# Patient Record
Sex: FEMALE | Race: WHITE
[De-identification: names, ages, dates, MRNs, and addresses within clinical notes are randomized per-mention and may not be internally consistent; named-entity substitution may affect disease eponyms.]

---
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
## 2025-03-25 ENCOUNTER — HOSPITAL ENCOUNTER (EMERGENCY)
Dept: HOSPITAL 47 - EC | Age: 20
LOS: 1 days | Discharge: HOME | End: 2025-03-26
Payer: COMMERCIAL

## 2025-03-25 VITALS
RESPIRATION RATE: 18 BRPM | HEART RATE: 101 BPM | DIASTOLIC BLOOD PRESSURE: 80 MMHG | SYSTOLIC BLOOD PRESSURE: 119 MMHG | TEMPERATURE: 97.5 F

## 2025-03-25 DIAGNOSIS — F39: Primary | ICD-10-CM

## 2025-03-25 PROCEDURE — 81025 URINE PREGNANCY TEST: CPT

## 2025-03-25 PROCEDURE — 99284 EMERGENCY DEPT VISIT MOD MDM: CPT

## 2025-03-25 PROCEDURE — 87591 N.GONORRHOEAE DNA AMP PROB: CPT

## 2025-03-25 PROCEDURE — 82075 ASSAY OF BREATH ETHANOL: CPT

## 2025-03-25 PROCEDURE — 87491 CHLMYD TRACH DNA AMP PROBE: CPT

## 2025-03-25 PROCEDURE — 81003 URINALYSIS AUTO W/O SCOPE: CPT

## 2025-03-25 PROCEDURE — 80306 DRUG TEST PRSMV INSTRMNT: CPT

## 2025-03-25 NOTE — ED
Psych HPI





- General


Source: patient, RN notes reviewed


Mode of arrival: ambulatory





<Shaw Gasca - Last Filed: 03/25/25 19:00>





- General


Source: patient, RN notes reviewed, old records reviewed, Caregiver


Mode of arrival: ambulatory


Limitations: no limitations





- History of Present Illness


MD Complaint: suicidal ideation, feels depressed


Associated Psychiatric Symptoms: depression, suicidal ideation


History of same: Yes


Quality: constant, getting worse


Improves With: none


Worsens With: none


Associated Symptoms: denies other symptoms


Treatments Prior to Arrival: placed on mental health hold


If Self Harm: admits thoughts of self harm





<Juan Chao - Last Filed: 03/25/25 20:16>





- General


Chief Complaint: Psychiatric Symptoms


Stated Complaint: depression


Time Seen by Provider: 03/25/25 18:37





- History of Present Illness


Initial Comments: 





Quick note: This is a 19-year-old female with history of depression presenting 

for suicidal ideation x 3 days.  Patient endorses history of suicide attempts 

but has not attempted today.  States she does not wish to discuss possible plan 

she has been well in the waiting room.  Denies taking any psychiatric medication

at this time and is not speaking to a therapist/counselor mother.  Denies 

auditory/visual hallucinations, HI. (Shaw Gasca)





- Related Data


                                Home Medications











 Medication  Instructions  Recorded  Confirmed


 


Methylphenidate HCl [Concerta] 54 mg PO DAILY 05/25/15 05/25/15








                                  Previous Rx's











 Medication  Instructions  Recorded


 


Amoxicillin 250 mg PO Q8HR #150 ml 05/25/15


 


Amoxicillin 500 mg PO BID 10 Days #20 capsule 08/27/18


 


Ibuprofen 200 mg PO Q6H PRN 5 Days #20 08/27/18





 capsule 











                                    Allergies











Allergy/AdvReac Type Severity Reaction Status Date / Time


 


No Known Allergies Allergy   Verified 08/27/18 16:59














Review of Systems


ROS Other: All systems not noted in ROS Statement are negative.





<Shaw Gasca - Last Filed: 03/25/25 19:00>


ROS Other: All systems not noted in ROS Statement are negative.





<Juan Chao - Last Filed: 03/25/25 20:16>


ROS Statement: 


Those systems with pertinent positive or pertinent negative responses have been 

documented in the HPI.








Past Medical History


Past Medical History: No Reported History


History of Any Multi-Drug Resistant Organisms: None Reported


Past Surgical History: No Surgical Hx Reported


Past Psychological History: ADD/ADHD


Smoking Status: Never smoker


Past Alcohol Use History: None Reported


Past Drug Use History: None Reported





<Shaw Gasca - Last Filed: 03/25/25 19:00>





General Exam


Limitations: no limitations





<Shaw Gasca - Last Filed: 03/25/25 19:00>


General appearance: alert, in no apparent distress


Head exam: Present: atraumatic, normocephalic, normal inspection


Eye exam: Present: normal appearance, PERRL, EOMI.  Absent: scleral icterus, co

njunctival injection, periorbital swelling


ENT exam: Present: normal exam, mucous membranes moist


Neck exam: Present: normal inspection.  Absent: tenderness, meningismus, 

lymphadenopathy


Respiratory exam: Present: normal lung sounds bilaterally.  Absent: respiratory 

distress, wheezes, rales, rhonchi, stridor


Cardiovascular Exam: Present: regular rate, normal rhythm, normal heart sounds. 

 Absent: systolic murmur, diastolic murmur, rubs, gallop, clicks


GI/Abdominal exam: Present: soft, normal bowel sounds.  Absent: distended, 

tenderness, guarding, rebound, rigid


Extremities exam: Present: normal inspection, full ROM, normal capillary refill.

  Absent: tenderness, pedal edema, joint swelling, calf tenderness


Back exam: Present: normal inspection


Neurological exam: Present: alert, oriented X3, CN II-XII intact


Psychiatric exam: Present: normal affect, normal mood


Skin exam: Present: warm, dry, intact, normal color.  Absent: rash





<Juan Chao - Last Filed: 03/25/25 20:16>





- General Exam Comments


Initial Comments: 





Visual Physical Exam





Vital signs reviewed





General: Well-appearing, nontoxic, no acute distress.


Head: Normocephalic, atraumatic


Eyes: PERRLA, EOMI


ENT: Airway patent


Chest: Nonlabored breathing


Skin: No visual rash, normal skin tone


Neuro: Alert and oriented 3


Musculoskeletal: No gross abnormalities


 (Shaw Gasca)





Course





<Juan Chao - Last Filed: 03/25/25 20:16>





                                   Vital Signs











  03/25/25





  18:32


 


Temperature 97.5 F L


 


Pulse Rate 101 H


 


Respiratory 18





Rate 


 


Blood Pressure 119/80


 


O2 Sat by Pulse 98





Oximetry 














- Reevaluation(s)


Reevaluation #1: 





03/25/25 20:15


Medical records reviewed (Juan Chao)


Reevaluation #2: 





03/25/25 20:15


Medically cleared for psychiatric evaluation (Juan Chao)


Reevaluation #3: 





Was pt. sent in by a medical professional or institution (, PA, NP, urgent 

care, hospital, or nursing home...) When possible be specific


@  -no


Did you speak to anyone other than the patient for history (EMS, parent, family,

 police, friend...)? What history was obtained from this source 


@  -no


Did you review nursing and triage notes (agree or disagree)?  Why? 


@  -agree


Are old charts reviewed (outside hosp., previous admission, EMS record, old EKG,

 old radiological studies, urgent care reports/EKG's, nursing home records)? 

Report findings 


@  -yes


Differential Diagnosis (chest pain, altered mental status, abdominal pain women,

 abdominal pain men, vaginal bleeding, weakness, fever, dyspnea, syncope, 

headache, dizziness, GI bleed, back pain, seizure, CVA, palpatations, mental 

health, musculoskeletal)? 


@  -prior


EKG interpreted by me (3pts min.).


@  -yes


X-rays interpreted by me (1pt min.).


@  -yes negative for acute disease


CT interpreted by me (1pt min.).


@  -no


U/S interpreted by me (1pt. min.).


@  -no


What testing was considered but not performed or refused? (CT, X-rays, U/S, 

labs)? Why?


@  -none


What meds were considered but not given or refused? Why?


@  -none


Did you discuss the management of the patient with other professionals 

(professionals i.e. LOIS Salinas, NP, lab, RT, psych nurse, , , 

teacher, , )? Give summary


@  -no


Was smoking cessation discussed for >3mins.?


@  -no


Was critical care preformed (if so, how long)?


@  -no


Were there social determinants of health that impacted care today? How? 

(Homelessness, low income, unemployed, alcoholism, drug addiction, 

transportation, low edu. Level, literacy, decrease access to med. care, intermediate, 

rehab)?


@  -none


Was there de-escalation of care discussed even if they declined (Discuss DNR or 

withdrawal of care, Hospice)? DNR status


@  -no


What co-morbidities impacted this encounter? (DM, HTN, Smoking, COPD, CAD, 

Cancer, CVA, ARF, Chemo, Hep., AIDS, mental health diagnosis, sleep apnea, 

morbid obesity)?


@  -none


Was patient admitted / discharged? Hospital course, mention meds given and 

route, prescriptions, significant lab abnormalities, going to OR and other 

pertinent info.


@  - 


Undiagnosed new problem with uncertain prognosis?


@  -no


Drug Therapy requiring intensive monitoring for toxicity (Heparin, Nitro, 

Insulin, Cardizem)?


@  -no


Were any procedures done?


@  -no


Diagnosis/symptom?


@  -


Acute, or Chronic, or Acute on Chronic?


@  -Acute


Uncomplicated (without systemic symptoms) or Complicated (systemic symptoms)?


@  -Complicated


Side effects of treatment?


@  -no


Exacerbation, Progression, or Severe Exacerbation?


@  -exacerbation


Poses a threat to life or bodily function? How? (Chest pain, USA, MI, pneumonia,

 PE, COPD, DKA, ARF, appy, cholecystitis, CVA, Diverticulitis, Homicidal, 

Suicidal, threat to staff... and all critical care pts)


@  -yes (Juan Chao)


Reevaluation #4: 





Differential Mental Health


Depression, anxiety, bipolar, psychosis, schizophrenia, borderline personality, 

situational depression, adjustment disorder, behavioral disorder, brain tumor, 

malingering, substance abuse, encephalopathy, medication reaction, dementia, 

hypothyroidism, degenerative neurologic disorder, lupus.... This is not meant to

 be all-inclusive list


 (Juan Chao)





Medical Decision Making





<Shaw Gasca - Last Filed: 03/25/25 19:00>





- Medical Decision Making





I completed the quick note portion of this chart signed TAE Lang (Shaw Gasca)





Disposition





<Shaw Gasca - Last Filed: 03/25/25 19:00>





<Juan Chao - Last Filed: 03/25/25 20:16>


Referrals: 


None,Stated [Primary Care Provider] - 1-2 days

## 2025-03-25 NOTE — ED
Medical Decision Making





- Medical Decision Making











Was patient admitted / discharged? Hospital course, mention meds given and 

route, prescriptions, significant lab abnormalities, going to OR and other 

pertinent info.


@  -[Patient is a 19-year-old woman who had been signed out pending EPS 

evaluation.  The patient was evaluated by EPS, staffed with the psychiatrist, 

and the patient stable to continue as outpatient.  They did develop safety plan.


Undiagnosed new problem with uncertain prognosis?


@  -[No]


Drug Therapy requiring intensive monitoring for toxicity (Heparin, Nitro, 

Insulin, Cardizem)?


@  -[No]


Were any procedures done?


@  -[No]


Diagnosis/symptom?


@  -[Mood disorder


Acute, or Chronic, or Acute on Chronic?


@  -[Acute uncomplicated


Uncomplicated (without systemic symptoms) or Complicated (systemic symptoms)?


@  -[UnComplicated


Side effects of treatment?


@  -[No]


Exacerbation, Progression, or Severe Exacerbation?


@  -[No]


Poses a threat to life or bodily function? How? (Chest pain, USA, MI, pneumonia,

 PE, COPD, DKA, ARF, appy, cholecystitis, CVA, Diverticulitis, Homicidal, 

Suicidal, threat to staff... and all critical care pts)


@  -[No]








All treatments are based on ideal body weight as in ED triage





- Lab Data


                                   Lab Results











  03/25/25 Range/Units





  20:33 


 


Urine Opiates Screen  Not Detected  (NotDetected)  


 


Ur Oxycodone Screen  Not Detected  (NotDetected)  


 


Urine Methadone Screen  Not Detected  (NotDetected)  


 


Ur Barbiturates Screen  Not Detected  (NotDetected)  


 


U Tricyclic Antidepress  Not Detected  (NotDetected)  


 


Ur Phencyclidine Scrn  Not Detected  (NotDetected)  


 


Ur Amphetamines Screen  Not Detected  (NotDetected)  


 


U Methamphetamines Scrn  Not Detected  (NotDetected)  


 


U Benzodiazepines Scrn  Not Detected  (NotDetected)  


 


Urine Cocaine Screen  Not Detected  (NotDetected)  


 


U Marijuana (THC) Screen  Detected H  (NotDetected)  














Disposition


Clinical Impression: 


 Mood disorder





Disposition: HOME SELF-CARE


Condition: Good


Instructions (If sedation given, give patient instructions):  Mood Disorders 

(ED), Help Prevent Suicide (ED)


Is patient prescribed a controlled substance at d/c from ED?: No


Referrals: 


None,Stated [Primary Care Provider] - 1-2 days

## 2025-03-26 LAB
PH UR: 7 [PH] (ref 5–8)
SP GR UR: 1.03 (ref 1–1.03)
UROBILINOGEN UR QL STRIP: 3 MG/DL (ref ?–2)